# Patient Record
Sex: MALE | Race: AMERICAN INDIAN OR ALASKA NATIVE
[De-identification: names, ages, dates, MRNs, and addresses within clinical notes are randomized per-mention and may not be internally consistent; named-entity substitution may affect disease eponyms.]

---

## 2017-07-27 ENCOUNTER — HOSPITAL ENCOUNTER (OUTPATIENT)
Dept: HOSPITAL 5 - MRI | Age: 67
Discharge: HOME | End: 2017-07-27
Attending: GENERAL PRACTICE
Payer: MEDICARE

## 2017-07-27 DIAGNOSIS — M17.12: Primary | ICD-10-CM

## 2017-07-27 DIAGNOSIS — M25.862: ICD-10-CM

## 2017-07-27 PROCEDURE — 73721 MRI JNT OF LWR EXTRE W/O DYE: CPT

## 2017-07-28 NOTE — MAGNETIC RESONANCE REPORT
MR LOWER EXTREMITY JOINT LEFT WITHOUT CONTRAST



HISTORY: Left knee pain.



TECHNIQUE: Multisequence, multiplanar MRI was performed through the 

left knee.



COMPARISON: None.



FINDINGS:



There is a moderate joint effusion which extends to the suprapatellar 

bursa. No popliteal cyst.



Advanced, severe osteoarthritic changes are identified in the medial 

compartment. There appears to be complete or near-complete cartilage 

loss over large areas of the medial femoral condyle and medial tibial 

plateau. There is articular surface sclerosis, small subchondral cysts 

and osteophytosis. Subchondral edema is also noted in the medial 

compartment suggesting mild bone contusions. Mild cartilage loss is 

noted in the lateral compartment and patellofemoral space.



The body and posterior horn of the medial meniscus are severely 

abnormal and presumably fragmented. The lateral meniscus is intact.



The PCL is intact but appears thickened with mild increased signal 

suggesting a PCL strain. The ACL, MCL, LCL complex and extensor complex 

are intact.



There is nonspecific subcutaneous edema in the anterior and medial soft 

tissues.



IMPRESSION:

Osteoarthritic changes. The medial compartment is by far the most 

affected compartment. There is essentially complete cartilage loss in 

the medial compartment with chronic findings as described.



Complex, fragmented tear in the body and posterior horn of the medial 

meniscus.



Joint effusion.



Possible PCL strain.

## 2021-08-28 ENCOUNTER — TELEPHONE ENCOUNTER (OUTPATIENT)
Dept: URBAN - METROPOLITAN AREA CLINIC 13 | Facility: CLINIC | Age: 71
End: 2021-08-28

## 2021-08-29 ENCOUNTER — TELEPHONE ENCOUNTER (OUTPATIENT)
Dept: URBAN - METROPOLITAN AREA CLINIC 13 | Facility: CLINIC | Age: 71
End: 2021-08-29